# Patient Record
Sex: FEMALE | Race: WHITE | Employment: PART TIME | URBAN - METROPOLITAN AREA
[De-identification: names, ages, dates, MRNs, and addresses within clinical notes are randomized per-mention and may not be internally consistent; named-entity substitution may affect disease eponyms.]

---

## 2019-03-30 ENCOUNTER — HOSPITAL ENCOUNTER (EMERGENCY)
Age: 24
Discharge: HOME | End: 2019-03-30
Payer: COMMERCIAL

## 2019-03-30 ENCOUNTER — APPOINTMENT (OUTPATIENT)
Dept: CT IMAGING | Age: 24
End: 2019-03-30
Payer: COMMERCIAL

## 2019-03-30 VITALS
DIASTOLIC BLOOD PRESSURE: 60 MMHG | OXYGEN SATURATION: 96 % | HEART RATE: 84 BPM | SYSTOLIC BLOOD PRESSURE: 143 MMHG | RESPIRATION RATE: 16 BRPM | TEMPERATURE: 98.4 F

## 2019-03-30 DIAGNOSIS — R51.9 UNILATERAL HEADACHE: Primary | ICD-10-CM

## 2019-03-30 PROCEDURE — 6360000002 HC RX W HCPCS: Performed by: PHYSICIAN ASSISTANT

## 2019-03-30 PROCEDURE — 70450 CT HEAD/BRAIN W/O DYE: CPT

## 2019-03-30 PROCEDURE — 99284 EMERGENCY DEPT VISIT MOD MDM: CPT

## 2019-03-30 PROCEDURE — 96361 HYDRATE IV INFUSION ADD-ON: CPT

## 2019-03-30 PROCEDURE — 2580000003 HC RX 258: Performed by: PHYSICIAN ASSISTANT

## 2019-03-30 PROCEDURE — 96375 TX/PRO/DX INJ NEW DRUG ADDON: CPT

## 2019-03-30 PROCEDURE — 96374 THER/PROPH/DIAG INJ IV PUSH: CPT

## 2019-03-30 RX ORDER — ONDANSETRON 2 MG/ML
4 INJECTION INTRAMUSCULAR; INTRAVENOUS ONCE
Status: COMPLETED | OUTPATIENT
Start: 2019-03-30 | End: 2019-03-30

## 2019-03-30 RX ORDER — KETOROLAC TROMETHAMINE 30 MG/ML
30 INJECTION, SOLUTION INTRAMUSCULAR; INTRAVENOUS ONCE
Status: COMPLETED | OUTPATIENT
Start: 2019-03-30 | End: 2019-03-30

## 2019-03-30 RX ORDER — 0.9 % SODIUM CHLORIDE 0.9 %
1000 INTRAVENOUS SOLUTION INTRAVENOUS ONCE
Status: COMPLETED | OUTPATIENT
Start: 2019-03-30 | End: 2019-03-30

## 2019-03-30 RX ORDER — BUTALBITAL, ACETAMINOPHEN AND CAFFEINE 300; 40; 50 MG/1; MG/1; MG/1
1 CAPSULE ORAL EVERY 4 HOURS PRN
Qty: 21 CAPSULE | Refills: 0 | Status: SHIPPED | OUTPATIENT
Start: 2019-03-30 | End: 2021-04-23

## 2019-03-30 RX ORDER — DIPHENHYDRAMINE HYDROCHLORIDE 50 MG/ML
25 INJECTION INTRAMUSCULAR; INTRAVENOUS ONCE
Status: COMPLETED | OUTPATIENT
Start: 2019-03-30 | End: 2019-03-30

## 2019-03-30 RX ADMIN — ONDANSETRON 4 MG: 2 INJECTION INTRAMUSCULAR; INTRAVENOUS at 14:08

## 2019-03-30 RX ADMIN — SODIUM CHLORIDE 1000 ML: 9 INJECTION, SOLUTION INTRAVENOUS at 13:53

## 2019-03-30 RX ADMIN — PROCHLORPERAZINE EDISYLATE 10 MG: 5 INJECTION INTRAMUSCULAR; INTRAVENOUS at 13:54

## 2019-03-30 RX ADMIN — DIPHENHYDRAMINE HYDROCHLORIDE 25 MG: 50 INJECTION, SOLUTION INTRAMUSCULAR; INTRAVENOUS at 13:53

## 2019-03-30 RX ADMIN — KETOROLAC TROMETHAMINE 30 MG: 30 INJECTION, SOLUTION INTRAMUSCULAR at 13:53

## 2019-03-30 ASSESSMENT — PAIN SCALES - GENERAL
PAINLEVEL_OUTOF10: 8
PAINLEVEL_OUTOF10: 8

## 2019-03-30 ASSESSMENT — ENCOUNTER SYMPTOMS
CHEST TIGHTNESS: 0
DIARRHEA: 0
SHORTNESS OF BREATH: 0
SINUS PRESSURE: 0
SORE THROAT: 0
SINUS PAIN: 0
VOMITING: 0
COLOR CHANGE: 0
NAUSEA: 0
ABDOMINAL PAIN: 0
PHOTOPHOBIA: 1

## 2019-03-30 NOTE — ED PROVIDER NOTES
History:      Diagnosis Date    Fracture 2005    R  [address]    Stye 4/09    recurring    Tear of medial collateral ligament of knee 6/2008    R      No past surgical history on file. Medications:  Previous Medications    No medications on file       Review of Systems:  Review of Systems   Constitutional: Negative for activity change, chills and fever. HENT: Negative for ear pain, sinus pressure, sinus pain and sore throat. Eyes: Positive for photophobia. Negative for visual disturbance. Respiratory: Negative for chest tightness and shortness of breath. Cardiovascular: Negative for chest pain. Gastrointestinal: Negative for abdominal pain, diarrhea, nausea and vomiting. Genitourinary: Negative for dysuria and flank pain. Musculoskeletal: Negative for arthralgias, gait problem, neck pain and neck stiffness. Skin: Negative for color change and wound. Neurological: Positive for headaches. Negative for dizziness, tremors, seizures, syncope, facial asymmetry, speech difficulty, weakness, light-headedness and numbness. Positives and Pertinent negatives as per HPI. Except as noted above in the ROS, problem specific ROS was completed and is negative. Physical Exam:  Physical Exam   Constitutional: She is oriented to person, place, and time. She appears well-developed and well-nourished. She is active and cooperative. She appears distressed (Tearful with mild acute painful distress noted. ). HENT:   Head: Normocephalic and atraumatic. Head is without raccoon's eyes and without Wells's sign. Right Ear: Hearing, tympanic membrane, external ear and ear canal normal. No hemotympanum. Left Ear: Hearing, tympanic membrane, external ear and ear canal normal. No hemotympanum. Nose: Nose normal.   Mouth/Throat: Uvula is midline and oropharynx is clear and moist.   Eyes: Conjunctivae are normal. Right eye exhibits no discharge. Left eye exhibits no discharge. No scleral icterus.    Neck: Normal 3/30/19 0724)   ondansetron (ZOFRAN) injection 4 mg (4 mg Intravenous Given 3/30/19 1408)       The patient's detailed history of present illness is documented as above. Upon arrival to the emergency department the patient's vital signs are as documented. The patient is noted to be hemodynamically stable and afebrile. Physical examination findings are as above. IV access was obtained. Laboratory testing a workup was initiated. CT imaging of the patient's head demonstrates no evidence of acute intercranial abnormality. Patient was medicated as above. Before she had been medicated she did have a single bout of emesis here in the emergency department. Family members at bedside. Patient is resting much more comfortably. I discussed ongoing care and management on an outpatient basis. She'll be discharged with the medications as below the given a work note for today. The patient has been made aware of the signs and symptoms which would necessitate an immediate return to the emergency department and verbalizes an understanding of these signs and symptoms. The patient presents with a benign-appearing headache. The neurologic examination of this patient is as documented above and is normal.  My suspicion for serious pathology is low given a lack of significant risk factors and reassuring history and physical examination. I see nothing to suggest subarachnoid hemorrhage, meningitis, encephalitis, mass lesion, intercranial bleeding or thrombosis. I feel the patient can be safely discharged to home with outpatient follow up. Instructions have been given for the patient to return if there is any significant worsening of the headache or the development of confusion, vision change, weakness, numbness, difficulty with speech or walking. The patient tolerated their visit well. I evaluated the patient. The physician was available for consultation as needed.   The patient and / or the family were informed of the results of anytests, a time was given to answer questions, a plan was proposed and they agreed with plan. CLINICAL IMPRESSION:  1.  Acute unilateral left headache; non-intractable        DISPOSITION: Discharged to home        PATIENT REFERRED TO:  Unspecified C[facility]Clinic          [facility] Emergency Department  [address] [facility]  [phone]    If symptoms worsen      DISCHARGE MEDICATIONS:  New Prescriptions    BUTALBITAL-APAP-CAFFEINE (FIORICET) -40 MG CAPS PER CAPSULE    Take 1 capsule by mouth every 4 hours as needed for Headaches or Migraine       DISCONTINUED MEDICATIONS:  Discontinued Medications    IBUPROFEN (ADVIL;MOTRIN) 800 MG TABLET    Take 1 tablet by mouth every 6 hours as needed for Pain    PRENATAL MV-MIN-FE FUM-FA-DHA (PRENATAL 1 PO)    Take 1 tablet by mouth              (Please note the MDM and HPI sections of this note were completed with a voice recognition program.  Efforts weremade to edit the dictations but occasionally words are mis-transcribed.)    Electronically signed, Kobe Pardo PA-C,          Matthew Dai PA-C  03/30/19 1427

## 2019-12-06 ENCOUNTER — HOSPITAL ENCOUNTER (EMERGENCY)
Age: 24
Discharge: HOME | End: 2019-12-06

## 2019-12-06 VITALS
DIASTOLIC BLOOD PRESSURE: 58 MMHG | SYSTOLIC BLOOD PRESSURE: 118 MMHG | RESPIRATION RATE: 16 BRPM | TEMPERATURE: 97.9 F | OXYGEN SATURATION: 99 % | HEART RATE: 59 BPM | WEIGHT: 119.71 LBS | BODY MASS INDEX: 20.55 KG/M2

## 2019-12-06 DIAGNOSIS — K08.89 PAIN, DENTAL: Primary | ICD-10-CM

## 2019-12-06 PROCEDURE — 6370000000 HC RX 637 (ALT 250 FOR IP): Performed by: PHYSICIAN ASSISTANT

## 2019-12-06 PROCEDURE — 99282 EMERGENCY DEPT VISIT SF MDM: CPT

## 2019-12-06 RX ORDER — IBUPROFEN 800 MG/1
800 TABLET ORAL EVERY 8 HOURS PRN
Qty: 30 TABLET | Refills: 0 | Status: SHIPPED | OUTPATIENT
Start: 2019-12-06

## 2019-12-06 RX ORDER — PENICILLIN V POTASSIUM 500 MG/1
500 TABLET ORAL 4 TIMES DAILY
Qty: 28 TABLET | Refills: 0 | Status: SHIPPED | OUTPATIENT
Start: 2019-12-06 | End: 2019-12-13

## 2019-12-06 RX ADMIN — TOPICAL ANESTHETIC: 200 SPRAY DENTAL; PERIODONTAL at 20:12

## 2019-12-06 ASSESSMENT — PAIN DESCRIPTION - LOCATION: LOCATION: TEETH

## 2019-12-06 ASSESSMENT — ENCOUNTER SYMPTOMS
NAUSEA: 0
VOMITING: 0
FACIAL SWELLING: 1

## 2019-12-06 ASSESSMENT — PAIN DESCRIPTION - FREQUENCY: FREQUENCY: CONTINUOUS

## 2019-12-06 ASSESSMENT — PAIN DESCRIPTION - DESCRIPTORS: DESCRIPTORS: ACHING;SHOOTING

## 2019-12-06 ASSESSMENT — PAIN SCALES - GENERAL: PAINLEVEL_OUTOF10: 10

## 2019-12-06 ASSESSMENT — PAIN DESCRIPTION - ORIENTATION: ORIENTATION: RIGHT

## 2020-06-25 ENCOUNTER — OFFICE VISIT (OUTPATIENT)
Dept: PRIMARY CARE CLINIC | Age: 25
End: 2020-06-25

## 2020-06-25 VITALS — OXYGEN SATURATION: 99 % | TEMPERATURE: 97.7 F | HEART RATE: 97 BPM

## 2020-06-25 PROCEDURE — 99213 OFFICE O/P EST LOW 20 MIN: CPT | Performed by: NURSE PRACTITIONER

## 2020-06-25 NOTE — PATIENT INSTRUCTIONS
bathroom, if available. Animals: You should restrict contact with pets and other animals while you are sick with COVID-19, just like you would around other people. Although there have not been reports of pets or other animals becoming sick with COVID-19, it is still recommended that people sick with COVID-19 limit contact with animals until more information is known about the virus. When possible, have another member of your household care for your animals while you are sick. If you are sick with COVID-19, avoid contact with your pet, including petting, snuggling, being kissed or licked, and sharing food. If you must care for your pet or be around animals while you are sick, wash your hands before and after you interact with pets and wear a facemask. Call ahead before visiting your doctor  If you have a medical appointment, call the healthcare provider and tell them that you have or may have COVID-19. This will help the healthcare providers office take steps to keep other people from getting infected or exposed. Wear a facemask  You should wear a facemask when you are around other people (e.g., sharing a room or vehicle) or pets and before you enter a healthcare providers office. If you are not able to wear a facemask (for example, because it causes trouble breathing), then people who live with you should not stay in the same room with you, or they should wear a facemask if they enter your room. Cover your coughs and sneezes  Cover your mouth and nose with a tissue when you cough or sneeze. Throw used tissues in a lined trash can. Immediately wash your hands with soap and water for at least 20 seconds or, if soap and water are not available, clean your hands with an alcohol-based hand  that contains at least 60% alcohol.   Clean your hands often  Wash your hands often with soap and water for at least 20 seconds, especially after blowing your nose, coughing, or sneezing; going to the bathroom; and have a medical emergency and need to call 911, notify the dispatch personnel that you have, or are being evaluated for COVID-19. If possible, put on a facemask before emergency medical services arrive. Discontinuing home isolation  Patients with confirmed COVID-19 should remain under home isolation precautions until the risk of secondary transmission to others is thought to be low. The decision to discontinue home isolation precautions should be made on a case-by-case basis, in consultation with healthcare providers and state and local health departments. The medication list included in this document is our record of what you are currently taking, including any changes that were made at today's visit.  If you find any differences when compared to your medications at home, or have any questions that were not answered at your visit, please contact the office.     Unspec[facility] (Inactive)

## 2020-06-25 NOTE — PROGRESS NOTES
FLU/COVID-19 CLINIC  2020  Patient ID:  Dustin Millard is a 25 y.o. female  : 1995    HPI/SYMPTOMS: Patient complaining of cough, chest discomfort X 2 days. She reports positive exposure.   She denies taking nothing over-the-counter to help with her symptoms    Symptom duration, days:  [] 1   [x] 2   [] 3   [] 4 - 7  [] 8 - 10   [] 11 - 13   [] >14    IF THE BELOW SYMPTOMS ARE NOT CHECKED, THEN THEY ARE NEGATIVE:  [] Fevers  {  [] Symptom (not measured)  [] Measured (Result:  degrees)  [] Chills  [x] Cough   [x] Dry   [] Productive  [] Coughing up blood    [] Short of breath  [] Rest  [] Exertion only   [x] Chest pain  Discomfort    [] Chest tightness  [] Chest congestion  [] Nasal congestion  [] Sneezing  [] Loss of smell or taste  [] Sore throat  [] Headaches  [] Tolerable  [] Severe     [] Muscle aches  [] Nausea  [] Vomiting  []Unable to keep fluids down     [] Diarrhea  []Severe     [] OTHER SYMPTOMS:      Symptom course:   [] Worsening     [x] Stable     [] Improving    RISK FACTORS (if not checked they are negative) :  [] Pregnant or possibly pregnant  [] Age over 61  [] Asthma  [] COPD/Other chronic lung diseases  [] Diabetes  [] Heart disease  [] Active Cancer  [] On Chemotherapy  [] History Lymphoma/Leukemia  [] Obesity  [x]Tobacco use, current  []Other:      [] Close contact with a lab confirmed COVID-19 patient within 14 days of symptom onset    ALLERGIES  No Known Allergies    Past Medical History:   Diagnosis Date    Fracture     R   # 1    Stye     recurring    Tear of medial collateral ligament of knee 2008    R    , No past surgical history on file.,   Social History     Tobacco Use    Smoking status: Current Every Day Smoker     Packs/day: 0.50     Types: Cigarettes    Smokeless tobacco: Never Used   Substance Use Topics    Alcohol use: No     Comment: socially     Drug use: No   ,   Family History   Problem Relation Age of Onset    Diabetes Maternal Grandmother  Breast Cancer Maternal Grandmother     Hypertension Maternal Grandmother     Cataracts Maternal Grandmother     Psoriasis Sister     Other Sister         recurrent UTIs    Other Sister         recurrent UTIs   [place] Migraines Mother     Other Mother         recurrent UTIs   ,   Immunization History   Administered Date(s) Administered    DTaP 02/06/1996, 04/30/1996, 11/05/1996, 10/21/1997, 05/24/1999    Hepatitis B 1995, 01/09/1996, 11/05/1996    Hib, unspecified 02/06/1996, 04/30/1996, 11/05/1996, 10/21/1997    MMR 01/08/1997, 05/24/2000    Meningococcal MCV4P (Menactra) 08/23/2007    Polio IPV (IPOL) 02/06/1996, 04/30/1996, 11/05/1996    Tdap (Boostrix, Adacel) 07/27/2011, 09/01/2015    Varicella (Varivax) 11/14/1996   ,   Health Maintenance   Topic Date Due    Varicella vaccine (2 of 2 - 2-dose childhood series) 10/03/1999    Pneumococcal 0-64 years Vaccine (1 of 1 - PPSV23) 10/03/2001    HPV vaccine (1 - 2-dose series) 10/03/2006    Chlamydia screen  10/30/2013    Cervical cancer screen  10/03/2016    Flu vaccine (Season Ended) 09/01/2020    DTaP/Tdap/Td vaccine (7 - Td) 09/01/2025    Hepatitis B vaccine  Completed    Hib vaccine  Completed    HIV screen  Completed    Hepatitis A vaccine  Aged Out    Meningococcal (ACWY) vaccine  Aged Out       VITALS:  Vitals:    06/25/20 1527   Pulse: 97   Temp: 97.7 °F (36.5 °C)   SpO2: 99%       Physical Exam:  [x]Alert  [x]No apparent distress   [x]Breathing appears normal   [x]Patient can speak in full sentences    []Ill appearing   []Lips are cyanotic   []Appears tachypneic     [x]Oriented to person/place/time     TESTS ORDERED:  [] POC Flu ordered  [] POC Strep ordered  [x] COVID 19 ordered    RESULTS:  No results found for this visit on 06/25/20. ASSESSMENT/PLAN:  Jackie was seen today for concern for covid-19. Diagnoses and all orders for this visit:    Suspected COVID-19 virus infection  -     COVID-19 Ambulatory;  Future  -

## 2020-06-26 ENCOUNTER — CARE COORDINATION (OUTPATIENT)
Dept: FAMILY MEDICINE CLINIC | Age: 25
End: 2020-06-26

## 2020-06-26 NOTE — CARE COORDINATION
Red alert noted in Loop remote symptom monitoring program.  HIPPA compliant vm message left and messaged patient via [facility] Loop to notify author if symptoms have worsened since yesterday:    Thank you for checking in with us. I called to discuss your symptoms with you. Please let me/us know if symptoms are worse than yesterday.   We are available between (8-4) M-F,9-1)S-S  Heidi [phone]

## 2020-06-26 NOTE — CARE COORDINATION
Patient contacted regarding COVID-19 exposure. Discussed COVID-19 related testing which was pending at this time. Test results were pending. Patient informed of results, if available? pending    Care Transition Nurse/ Ambulatory Care Manager contacted the patient by telephone to perform post discharge assessment. Verified name and  with patient as identifiers. Provided introduction to self, and explanation of the CTN/ACM role, and reason for call due to risk factors for infection and/or exposure to COVID-19. Symptoms reviewed with patient who verbalized the following symptoms: fatigue, cough, no new symptoms and no worsening symptoms. Due to no new or worsening symptoms encounter was not routed to provider for escalation. Discussed follow-up appointments. If no appointment was previously scheduled, appointment scheduling offered: Yes  [facility] follow up appointment(s): No future appointments. Non-[facility] follow up appointment(s): NA     Patient has following risk factors of: no known risk factors. CTN/ACM reviewed discharge instructions, medical action plan and red flags such as increased shortness of breath, increasing fever and signs of decompensation with patient who verbalized understanding. Discussed exposure protocols and quarantine with CDC Guidelines What to do if you are sick with coronavirus disease 2019.  Patient was given an opportunity for questions and concerns. The patient agrees to contact the [facility] exposure line [phone], local health department [place] Department of Health: ([phone]) and PCP office for questions related to their healthcare. CTN/ACM provided contact information for future needs.     Reviewed and educated patient on any new and changed medications related to discharge diagnosis     Patient/family/caregiver given information for [employer] Loop and agrees to enroll yes  Patient's preferred e-mail: enrolled   Patient's preferred phone number: enrolled  Based on Loop alert triggers, patient will be contacted by nurse care manager for worsening symptoms. Pt will be further monitored by COVID Loop Team based on severity of symptoms and risk factors.     Gopal Galeas RN, MSN  Ambulatory Care Manager  [phone]

## 2020-06-28 LAB
SARS-COV-2: NOT DETECTED
SOURCE: NORMAL

## 2020-08-30 ENCOUNTER — HOSPITAL ENCOUNTER (EMERGENCY)
Age: 25
Discharge: HOME | End: 2020-08-30
Payer: MEDICAID

## 2020-08-30 VITALS
BODY MASS INDEX: 20.49 KG/M2 | DIASTOLIC BLOOD PRESSURE: 65 MMHG | HEIGHT: 65 IN | SYSTOLIC BLOOD PRESSURE: 113 MMHG | HEART RATE: 72 BPM | RESPIRATION RATE: 16 BRPM | OXYGEN SATURATION: 98 % | TEMPERATURE: 98.8 F | WEIGHT: 123 LBS

## 2020-08-30 PROCEDURE — 10060 I&D ABSCESS SIMPLE/SINGLE: CPT

## 2020-08-30 PROCEDURE — 99282 EMERGENCY DEPT VISIT SF MDM: CPT

## 2020-08-30 RX ORDER — SULFAMETHOXAZOLE AND TRIMETHOPRIM 800; 160 MG/1; MG/1
1 TABLET ORAL 2 TIMES DAILY
Qty: 14 TABLET | Refills: 0 | Status: SHIPPED | OUTPATIENT
Start: 2020-08-30 | End: 2020-09-06

## 2020-08-30 RX ORDER — NAPROXEN 500 MG/1
500 TABLET ORAL 2 TIMES DAILY WITH MEALS
Qty: 30 TABLET | Refills: 0 | Status: SHIPPED | OUTPATIENT
Start: 2020-08-30

## 2020-08-30 RX ORDER — CEPHALEXIN 500 MG/1
500 CAPSULE ORAL 4 TIMES DAILY
Qty: 28 CAPSULE | Refills: 0 | Status: SHIPPED | OUTPATIENT
Start: 2020-08-30 | End: 2020-09-06

## 2020-08-30 ASSESSMENT — ENCOUNTER SYMPTOMS
ROS SKIN COMMENTS: SEE HPI
VOMITING: 0
NAUSEA: 0
SHORTNESS OF BREATH: 0
CHEST TIGHTNESS: 0

## 2020-08-30 ASSESSMENT — PAIN DESCRIPTION - PAIN TYPE: TYPE: ACUTE PAIN

## 2020-08-30 ASSESSMENT — PAIN DESCRIPTION - ORIENTATION: ORIENTATION: LEFT

## 2020-08-30 NOTE — ED PROVIDER NOTES
[facility] EMERGENCY DEPARTMENT  [address] [address]  Dept: [address] [address] [facility] [address]: [phone]  eMERGENCYdEPARTMENT eNCOUnter      Pt Name: Alex Bull  MRN: [id number]  Ezequiel 1995  Date of evaluation: 8/30/2020  Provider:Tess Wyman PA-C    CHIEF COMPLAINT     No chief complaint on file. HISTORY OF PRESENT ILLNESS  (Location/Symptom, Timing/Onset, Context/Setting, Quality, Duration,Modifying Factors, Severity.)   Alex Bull is a 25 y.o. female who presents to the emergency department by private complaining of infected bug bite to left forearm. Patient states she initially noticed a small bump to her left arm on Wednesday. States she had increasing swelling and pain to bump since. States she has been applying warm compresses with mild pus drainage from region. Given rest of worsening symptoms she sought evaluation in the ED. Denies any history of IV drug abuse. Denies any fevers, chills, vomiting, generalized ill feeling. No other complaints this time. No other medical problems. Nursing Notes were reviewedand agreed with or any disagreements were addressed in the HPI. REVIEW OF SYSTEMS    (2-9 systems for level 4, 10 or more for level 5)     Review of Systems   Constitutional: Negative for chills and fever. HENT: Negative. Respiratory: Negative for chest tightness and shortness of breath. Cardiovascular: Negative. Gastrointestinal: Negative for nausea and vomiting. Genitourinary: Negative. Musculoskeletal: Negative for arthralgias and myalgias. Skin:        See HPI   Neurological: Negative for dizziness and light-headedness. Psychiatric/Behavioral: Negative for behavioral problems and confusion. Except as noted above the remainder of the review of systems was reviewed and negative.        PAST MEDICAL HISTORY         Diagnosis Date    Fracture 2005    R  [address]    Stye 4/09    recurring    Tear of medial collateral ligament of knee 6/2008    R        SURGICAL HISTORY     History reviewed. No pertinent surgical history. CURRENT MEDICATIONS     [unfilled]    ALLERGIES     Patient has no known allergies. FAMILY HISTORY           Problem Relation Age of Onset    Diabetes Maternal Grandmother     Breast Cancer Maternal Grandmother     Hypertension Maternal Grandmother     Cataracts Maternal Grandmother     Psoriasis Sister     Other Sister         recurrent UTIs    Other Sister         recurrent UTIs   [facility] Migraines Mother     Other Mother         recurrent UTIs     Family Status   Relation Name Status    MGM  Alive    Sister Lizandro Tripp Father 1970 Other        no contact    Brother 0 (Not Specified)   [place] [address] Alive    MUnc 0 (Not Specified)    PAunt 1 Alive    PUnc 0 (Not Specified)    MGF  Alive    PGM  Other        ? ?    PGF  Other        ? ?    Sister Rudy Noun Alive    Mother [address] (Not Specified)        SOCIAL HISTORY      reports that she has been smoking cigarettes. She has been smoking about 0.50 packs per day. She has never used smokeless tobacco. She reports that she does not drink alcohol or use drugs. PHYSICAL EXAM    (up to 7 for level 4, 8 or more for level 5)     ED Triage Vitals [08/30/20 1303]   Enc Vitals Group      /80      Pulse 93      Resp 24      Temp 98.8 °F (37.1 °C)      Temp src       SpO2 98 %      Weight 123 lb (55.8 kg)      Height 5' 5\" (1.651 m)      Head Circumference       Peak Flow       Pain Score       Pain Loc       Pain Edu? Excl. in [address]? Physical Exam  Vitals signs reviewed. Constitutional:       Appearance: Normal appearance. HENT:      Head: Normocephalic and atraumatic. Pulmonary:      Effort: Pulmonary effort is normal. No respiratory distress. Musculoskeletal: Normal range of motion. Skin:     General: Skin is warm.           Neurological:      General: No focal deficit present. Mental Status: She is alert and oriented to person, place, and time. Psychiatric:         Mood and Affect: Mood normal.         Behavior: Behavior normal.           DIAGNOSTIC RESULTS     EKG: All EKG's are interpreted by the Emergency Department Physician who either signs or Co-signs this chart in the absence of a cardiologist.    RADIOLOGY:   Non-plain film images such as CT, Ultrasound and MRI are read by the radiologist. Plain radiographic images are visualized and preliminarilyinterpreted by the emergency physician with the below findings:    Interpretation per the Radiologist below,if available at the time of this note:    No orders to display         LABS:  Labs Reviewed - No data to display    All other labs were within normal range or not returned as of this dictation. EMERGENCY DEPARTMENT COURSE and DIFFERENTIAL DIAGNOSIS/MDM:   Vitals:    Vitals:    08/30/20 1303 08/30/20 1355   BP: 135/80 113/65   Pulse: 93 72   Resp: 24 16   Temp: 98.8 °F (37.1 °C)    SpO2: 98% 98%   Weight: 123 lb (55.8 kg)    Height: 5' 5\" (1.651 m)        MDM     Patient presents ED with HPI noted above. She is hemodynamically stable, afebrile and nontoxic-appearing. She is not hypoxic with oxygen saturation of 98% on room air. Physical exam as above. Patient with abscess and cellulitis to left forearm as noted above. Abscess I&D done as noted above. Wish to have been more aggressive with I&D however patient cannot tolerate local anesthesia. Patient to apply warm compresses to affected region. Patient prescribed Keflex and Bactrim. She is given Naprosyn for pain inflammation. She was told to return the ED in 2 days for reevaluation. Told return sooner if fevers, acute worsening symptoms, significant spreading redness or streaking up arm or other concerning symptoms. She voiced understanding. PCP referral provided time of discharge. Patient discharged home in stable condition.     The patient tolerated their visit well. I have discussed the findings of today's workup with the patient and addressed the patient's questions and concerns. Important warning signs as well as new or worsening symptoms which would necessitate immediate return to the ED were discussed. The plan is to discharge from the ED at this time, and the patient is in stable condition. The patient acknowledged understanding is agreeable with this plan. CONSULTS:  None    PROCEDURES:  Incision/Drainage    Date/Time: 8/30/2020 1:52 PM  Performed by: Alli Mario PA-C  Authorized by: Alli Mario PA-C     Consent:     Consent obtained:  Verbal    Consent given by:  Patient  Location:     Type:  Abscess    Location:  Upper extremity    Upper extremity location:  Arm    Arm location:  L upper arm  Pre-procedure details:     Skin preparation:  Betadine  Anesthesia (see MAR for exact dosages): Anesthesia method:  Local infiltration    Local anesthetic:  Lidocaine 1% WITH epi  Procedure type:     Complexity:  Simple  Procedure details:     Needle aspiration: no      Incision types:  Single straight    Incision depth:  Subcutaneous    Scalpel blade:  11    Drainage:  Purulent and bloody    Drainage amount:  Scant    Wound treatment:  Wound left open  Post-procedure details:     Patient tolerance of procedure: Tolerated well, no immediate complications        FINAL IMPRESSION      1.  Abscess of arm, left          DISPOSITION/PLAN   [unfilled]    PATIENT REFERRED TO:  [facility] Emergency Department  [address] [id number]  [phone]  Go to   If symptoms worsen    [facility]) Pre-Services  [phone]          DISCHARGE MEDICATIONS:  Discharge Medication List as of 8/30/2020  1:49 PM      START taking these medications    Details   sulfamethoxazole-trimethoprim (BACTRIM DS) 800-160 MG per tablet Take 1 tablet by mouth 2 times daily for 7 days, Disp-14 tablet,R-0Print      cephALEXin (Alix Mess) 500 MG capsule Take 1 capsule by mouth 4 times daily for 7 days, Disp-28 capsule,R-0Print      naproxen (NAPROSYN) 500 MG tablet Take 1 tablet by mouth 2 times daily (with meals), Disp-30 tablet,R-0Print             (Please note that portions of this note were completed with a voice recognition program.  Efforts were made to edit the dictations but occasionally words are mis-transcribed.)    [address] [facility]          [address] [place] [address]  08/30/20 7049

## 2020-08-30 NOTE — ED NOTES
Wound care instructions given to pt, wound bandaged, extra supplies given to pt for dressing changes.  Pt verbalized understanding     Mayito Cruz RN  20 [phone]

## 2022-07-10 ENCOUNTER — HOSPITAL ENCOUNTER (EMERGENCY)
Age: 27
Discharge: HOME | End: 2022-07-11
Attending: EMERGENCY MEDICINE
Payer: COMMERCIAL

## 2022-07-10 ENCOUNTER — APPOINTMENT (OUTPATIENT)
Dept: CT IMAGING | Age: 27
End: 2022-07-10
Payer: COMMERCIAL

## 2022-07-10 VITALS
HEART RATE: 74 BPM | WEIGHT: 139.5 LBS | OXYGEN SATURATION: 100 % | SYSTOLIC BLOOD PRESSURE: 118 MMHG | DIASTOLIC BLOOD PRESSURE: 73 MMHG | TEMPERATURE: 97.9 F | BODY MASS INDEX: 23.21 KG/M2 | RESPIRATION RATE: 15 BRPM

## 2022-07-10 DIAGNOSIS — S09.90XA CLOSED HEAD INJURY, INITIAL ENCOUNTER: Primary | ICD-10-CM

## 2022-07-10 PROCEDURE — 70450 CT HEAD/BRAIN W/O DYE: CPT

## 2022-07-10 PROCEDURE — 72125 CT NECK SPINE W/O DYE: CPT

## 2022-07-10 PROCEDURE — 99284 EMERGENCY DEPT VISIT MOD MDM: CPT
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
## 2022-07-11 NOTE — ED NOTES
Pt d/c'd home. Reviewed d/c instructions, home meds, and  f/u information utilizing teach-back method. Patient verbalized understanding.           Rigo Dumont RN  07/11/22 [id number]